# Patient Record
Sex: FEMALE | Race: WHITE | Employment: FULL TIME | URBAN - METROPOLITAN AREA
[De-identification: names, ages, dates, MRNs, and addresses within clinical notes are randomized per-mention and may not be internally consistent; named-entity substitution may affect disease eponyms.]

---

## 2017-02-01 ENCOUNTER — APPOINTMENT (OUTPATIENT)
Dept: PHYSICAL THERAPY | Age: 47
End: 2017-02-01

## 2020-09-30 ENCOUNTER — IMPORTED ENCOUNTER (OUTPATIENT)
Dept: URBAN - METROPOLITAN AREA CLINIC 1 | Facility: CLINIC | Age: 50
End: 2020-09-30

## 2020-09-30 PROBLEM — Z01.00: Noted: 2020-09-30

## 2020-09-30 PROBLEM — H52.4: Noted: 2020-09-30

## 2020-09-30 PROCEDURE — S0620 ROUTINE OPHTHALMOLOGICAL EXA: HCPCS

## 2020-09-30 NOTE — PATIENT DISCUSSION
1.  Presbyopia: Rx was given for corrective spectacles if indicated. Return for an appointment in 1 year 36 with Dr. Gertrude Roman.

## 2020-09-30 NOTE — PATIENT DISCUSSION
Routine Exam- Patient has minimal refractive error. All conditions discussed with patient and parent today.

## 2022-01-06 ENCOUNTER — IMPORTED ENCOUNTER (OUTPATIENT)
Dept: URBAN - METROPOLITAN AREA CLINIC 1 | Facility: CLINIC | Age: 52
End: 2022-01-06

## 2022-01-06 PROBLEM — H52.02: Noted: 2022-01-06

## 2022-01-06 PROBLEM — H52.4: Noted: 2022-01-06

## 2022-01-06 PROCEDURE — S0621 ROUTINE OPHTHALMOLOGICAL EXA: HCPCS

## 2022-01-06 NOTE — PATIENT DISCUSSION
1.  Hyperopia OS -- Rx was given for corrective spectacles if indicated. 2.  Presbyopia Return for an appointment in 1 year 36 with Dr. Rashawn Castorena.

## 2022-04-08 ASSESSMENT — VISUAL ACUITY
OD_SC: J3
OS_SC: J3
OD_SC: J3
OS_SC: J3
OD_CC: 20/20
OS_CC: 20/20
OD_CC: 20/20
OS_CC: 20/25

## 2022-04-08 ASSESSMENT — TONOMETRY
OD_IOP_MMHG: 15
OS_IOP_MMHG: 15
OS_IOP_MMHG: 16
OD_IOP_MMHG: 14